# Patient Record
Sex: FEMALE | Race: BLACK OR AFRICAN AMERICAN | NOT HISPANIC OR LATINO | Employment: FULL TIME | RURAL
[De-identification: names, ages, dates, MRNs, and addresses within clinical notes are randomized per-mention and may not be internally consistent; named-entity substitution may affect disease eponyms.]

---

## 2021-05-20 ENCOUNTER — OFFICE VISIT (OUTPATIENT)
Dept: FAMILY MEDICINE | Facility: CLINIC | Age: 41
End: 2021-05-20
Payer: COMMERCIAL

## 2021-05-20 VITALS
BODY MASS INDEX: 26.22 KG/M2 | DIASTOLIC BLOOD PRESSURE: 110 MMHG | HEART RATE: 105 BPM | OXYGEN SATURATION: 100 % | WEIGHT: 148 LBS | RESPIRATION RATE: 18 BRPM | HEIGHT: 63 IN | TEMPERATURE: 98 F | SYSTOLIC BLOOD PRESSURE: 170 MMHG

## 2021-05-20 DIAGNOSIS — M25.532 LEFT WRIST PAIN: Primary | ICD-10-CM

## 2021-05-20 PROCEDURE — 99213 PR OFFICE/OUTPT VISIT, EST, LEVL III, 20-29 MIN: ICD-10-PCS | Mod: 25,,, | Performed by: NURSE PRACTITIONER

## 2021-05-20 PROCEDURE — 99213 OFFICE O/P EST LOW 20 MIN: CPT | Mod: 25,,, | Performed by: NURSE PRACTITIONER

## 2021-05-20 PROCEDURE — 96372 PR INJECTION,THERAP/PROPH/DIAG2ST, IM OR SUBCUT: ICD-10-PCS | Mod: ,,, | Performed by: NURSE PRACTITIONER

## 2021-05-20 PROCEDURE — 96372 THER/PROPH/DIAG INJ SC/IM: CPT | Mod: ,,, | Performed by: NURSE PRACTITIONER

## 2021-05-20 RX ORDER — PREDNISONE 5 MG/1
TABLET ORAL
Qty: 9 TABLET | Refills: 0 | Status: SHIPPED | OUTPATIENT
Start: 2021-05-20

## 2021-05-20 RX ORDER — AMLODIPINE BESYLATE 5 MG/1
5 TABLET ORAL DAILY
COMMUNITY
Start: 2021-03-12

## 2021-05-20 RX ORDER — KETOROLAC TROMETHAMINE 30 MG/ML
60 INJECTION, SOLUTION INTRAMUSCULAR; INTRAVENOUS
Status: COMPLETED | OUTPATIENT
Start: 2021-05-20 | End: 2021-05-20

## 2021-05-20 RX ORDER — DICLOFENAC SODIUM 75 MG/1
75 TABLET, DELAYED RELEASE ORAL 2 TIMES DAILY PRN
Qty: 60 TABLET | Refills: 1 | Status: SHIPPED | OUTPATIENT
Start: 2021-05-20

## 2021-05-20 RX ADMIN — KETOROLAC TROMETHAMINE 60 MG: 30 INJECTION, SOLUTION INTRAMUSCULAR; INTRAVENOUS at 11:05

## 2021-06-02 ENCOUNTER — RECORDS - HEALTHEAST (OUTPATIENT)
Dept: ADMINISTRATIVE | Facility: CLINIC | Age: 41
End: 2021-06-02

## 2021-06-23 ENCOUNTER — OFFICE VISIT (OUTPATIENT)
Dept: FAMILY MEDICINE | Facility: CLINIC | Age: 41
End: 2021-06-23
Payer: COMMERCIAL

## 2021-06-23 VITALS
BODY MASS INDEX: 26.19 KG/M2 | TEMPERATURE: 98 F | OXYGEN SATURATION: 98 % | HEIGHT: 63 IN | RESPIRATION RATE: 17 BRPM | DIASTOLIC BLOOD PRESSURE: 90 MMHG | HEART RATE: 104 BPM | WEIGHT: 147.81 LBS | SYSTOLIC BLOOD PRESSURE: 142 MMHG

## 2021-06-23 DIAGNOSIS — R30.0 DYSURIA: ICD-10-CM

## 2021-06-23 DIAGNOSIS — R31.9 HEMATURIA, UNSPECIFIED TYPE: ICD-10-CM

## 2021-06-23 DIAGNOSIS — J32.9 SINUSITIS, UNSPECIFIED CHRONICITY, UNSPECIFIED LOCATION: Primary | ICD-10-CM

## 2021-06-23 DIAGNOSIS — N30.00 ACUTE CYSTITIS WITHOUT HEMATURIA: ICD-10-CM

## 2021-06-23 LAB
BILIRUB SERPL-MCNC: NEGATIVE MG/DL
BLOOD URINE, POC: ABNORMAL
COLOR, POC UA: YELLOW
GLUCOSE UR QL STRIP: NEGATIVE
KETONES UR QL STRIP: ABNORMAL
LEUKOCYTE ESTERASE URINE, POC: ABNORMAL
NITRITE, POC UA: POSITIVE
PH, POC UA: 6
PROTEIN, POC: ABNORMAL
SPECIFIC GRAVITY, POC UA: 1.02
UROBILINOGEN, POC UA: 1

## 2021-06-23 PROCEDURE — 99214 PR OFFICE/OUTPT VISIT, EST, LEVL IV, 30-39 MIN: ICD-10-PCS | Mod: 25,,, | Performed by: FAMILY MEDICINE

## 2021-06-23 PROCEDURE — 96372 THER/PROPH/DIAG INJ SC/IM: CPT | Mod: ,,, | Performed by: FAMILY MEDICINE

## 2021-06-23 PROCEDURE — 99214 OFFICE O/P EST MOD 30 MIN: CPT | Mod: 25,,, | Performed by: FAMILY MEDICINE

## 2021-06-23 PROCEDURE — 81003 POCT URINALYSIS W/O SCOPE: ICD-10-PCS | Mod: QW,,, | Performed by: FAMILY MEDICINE

## 2021-06-23 PROCEDURE — 99051 MED SERV EVE/WKEND/HOLIDAY: CPT | Mod: ,,, | Performed by: FAMILY MEDICINE

## 2021-06-23 PROCEDURE — 96372 PR INJECTION,THERAP/PROPH/DIAG2ST, IM OR SUBCUT: ICD-10-PCS | Mod: ,,, | Performed by: FAMILY MEDICINE

## 2021-06-23 PROCEDURE — 81003 URINALYSIS AUTO W/O SCOPE: CPT | Mod: QW,,, | Performed by: FAMILY MEDICINE

## 2021-06-23 PROCEDURE — 99051 PR MEDICAL SERVICES, EVE/WKEND/HOLIDAY: ICD-10-PCS | Mod: ,,, | Performed by: FAMILY MEDICINE

## 2021-06-23 RX ORDER — DEXAMETHASONE SODIUM PHOSPHATE 4 MG/ML
4 INJECTION, SOLUTION INTRA-ARTICULAR; INTRALESIONAL; INTRAMUSCULAR; INTRAVENOUS; SOFT TISSUE
Status: COMPLETED | OUTPATIENT
Start: 2021-06-23 | End: 2021-06-23

## 2021-06-23 RX ORDER — SULFAMETHOXAZOLE AND TRIMETHOPRIM 800; 160 MG/1; MG/1
1 TABLET ORAL 2 TIMES DAILY
Qty: 10 TABLET | Refills: 0 | Status: SHIPPED | OUTPATIENT
Start: 2021-06-23 | End: 2021-06-28

## 2021-06-23 RX ORDER — PREDNISONE 20 MG/1
20 TABLET ORAL DAILY
Qty: 5 TABLET | Refills: 0 | Status: SHIPPED | OUTPATIENT
Start: 2021-06-23 | End: 2021-06-28

## 2021-06-23 RX ORDER — CEFTRIAXONE 1 G/1
1 INJECTION, POWDER, FOR SOLUTION INTRAMUSCULAR; INTRAVENOUS
Status: COMPLETED | OUTPATIENT
Start: 2021-06-23 | End: 2021-06-23

## 2021-06-23 RX ADMIN — DEXAMETHASONE SODIUM PHOSPHATE 4 MG: 4 INJECTION, SOLUTION INTRA-ARTICULAR; INTRALESIONAL; INTRAMUSCULAR; INTRAVENOUS; SOFT TISSUE at 07:06

## 2021-06-23 RX ADMIN — CEFTRIAXONE 1 G: 1 INJECTION, POWDER, FOR SOLUTION INTRAMUSCULAR; INTRAVENOUS at 07:06

## 2022-01-12 ENCOUNTER — CLINICAL SUPPORT (OUTPATIENT)
Dept: PRIMARY CARE CLINIC | Facility: CLINIC | Age: 42
End: 2022-01-12

## 2022-01-12 DIAGNOSIS — Z11.1 SCREENING-PULMONARY TB: Primary | ICD-10-CM

## 2022-01-12 PROCEDURE — 99000 SPECIMEN HANDLING OFFICE-LAB: CPT | Mod: ,,, | Performed by: NURSE PRACTITIONER

## 2022-01-12 PROCEDURE — 86580 TB INTRADERMAL TEST: CPT | Mod: ,,, | Performed by: NURSE PRACTITIONER

## 2022-01-12 PROCEDURE — 86580 PR  TB INTRADERMAL TEST: ICD-10-PCS | Mod: ,,, | Performed by: NURSE PRACTITIONER

## 2022-01-12 PROCEDURE — 99000 PR SPECIMEN HANDLING,DR OFF->LAB: ICD-10-PCS | Mod: ,,, | Performed by: NURSE PRACTITIONER

## 2022-01-12 NOTE — PROGRESS NOTES
Subjective:       Patient ID: Penny Lozano is a 41 y.o. female.    Chief Complaint: No chief complaint on file.    HPI  Review of Systems      Objective:      Physical Exam    Assessment:       Problem List Items Addressed This Visit    None     Visit Diagnoses     Screening-pulmonary TB    -  Primary    Relevant Orders    POCT TB Skin Test          Plan:       See scanned documents in .

## 2022-01-14 LAB
TB INDURATION - 48 HR READ: 0 MM
TB INDURATION - 72 HR READ: NORMAL
TB SKIN TEST - 48 HR READ: NEGATIVE
TB SKIN TEST - 72 HR READ: NORMAL

## 2022-09-19 ENCOUNTER — HOSPITAL ENCOUNTER (EMERGENCY)
Facility: HOSPITAL | Age: 42
Discharge: HOME | End: 2022-09-20
Attending: FAMILY MEDICINE

## 2022-09-19 DIAGNOSIS — R07.9 CHEST PAIN: ICD-10-CM

## 2022-09-19 PROCEDURE — 99283 PR EMERGENCY DEPT VISIT,LEVEL III: ICD-10-PCS | Mod: ,,, | Performed by: FAMILY MEDICINE

## 2022-09-19 PROCEDURE — 99283 EMERGENCY DEPT VISIT LOW MDM: CPT | Mod: ,,, | Performed by: FAMILY MEDICINE

## 2022-09-19 PROCEDURE — 93005 ELECTROCARDIOGRAM TRACING: CPT

## 2022-09-19 PROCEDURE — 93010 EKG 12-LEAD: ICD-10-PCS | Mod: ,,, | Performed by: HOSPITALIST

## 2022-09-19 PROCEDURE — 99284 EMERGENCY DEPT VISIT MOD MDM: CPT

## 2022-09-19 PROCEDURE — 93010 ELECTROCARDIOGRAM REPORT: CPT | Mod: ,,, | Performed by: HOSPITALIST

## 2022-09-19 NOTE — Clinical Note
"Penny ARCHIBALD"Blake was seen and treated in our emergency department on 9/19/2022.  She may return to work on 09/21/2022.       If you have any questions or concerns, please don't hesitate to call.      Sydni Brantley MD"

## 2022-09-20 ENCOUNTER — TELEPHONE (OUTPATIENT)
Dept: EMERGENCY MEDICINE | Facility: HOSPITAL | Age: 42
End: 2022-09-20

## 2022-09-20 VITALS
DIASTOLIC BLOOD PRESSURE: 88 MMHG | RESPIRATION RATE: 18 BRPM | BODY MASS INDEX: 31.01 KG/M2 | TEMPERATURE: 99 F | WEIGHT: 175 LBS | HEART RATE: 87 BPM | SYSTOLIC BLOOD PRESSURE: 130 MMHG | HEIGHT: 63 IN | OXYGEN SATURATION: 99 %

## 2022-09-20 LAB
ALBUMIN SERPL BCP-MCNC: 4.1 G/DL (ref 3.5–5)
ALBUMIN/GLOB SERPL: 0.9 {RATIO}
ALP SERPL-CCNC: 70 U/L (ref 37–98)
ALT SERPL W P-5'-P-CCNC: 19 U/L (ref 13–56)
ANION GAP SERPL CALCULATED.3IONS-SCNC: 9 MMOL/L (ref 7–16)
AST SERPL W P-5'-P-CCNC: 15 U/L (ref 15–37)
BASOPHILS # BLD AUTO: 0.03 K/UL (ref 0–0.2)
BASOPHILS NFR BLD AUTO: 0.7 % (ref 0–1)
BILIRUB SERPL-MCNC: 0.7 MG/DL (ref ?–1.2)
BUN SERPL-MCNC: 8 MG/DL (ref 7–18)
BUN/CREAT SERPL: 9 (ref 6–20)
CALCIUM SERPL-MCNC: 9.5 MG/DL (ref 8.5–10.1)
CHLORIDE SERPL-SCNC: 108 MMOL/L (ref 98–107)
CO2 SERPL-SCNC: 27 MMOL/L (ref 21–32)
CREAT SERPL-MCNC: 0.86 MG/DL (ref 0.55–1.02)
DIFFERENTIAL METHOD BLD: ABNORMAL
EGFR (NO RACE VARIABLE) (RUSH/TITUS): 88 ML/MIN/1.73M²
EOSINOPHIL # BLD AUTO: 0.19 K/UL (ref 0–0.5)
EOSINOPHIL NFR BLD AUTO: 4.3 % (ref 1–4)
ERYTHROCYTE [DISTWIDTH] IN BLOOD BY AUTOMATED COUNT: 14.4 % (ref 11.5–14.5)
GLOBULIN SER-MCNC: 4.4 G/DL (ref 2–4)
GLUCOSE SERPL-MCNC: 93 MG/DL (ref 74–106)
HCT VFR BLD AUTO: 39.2 % (ref 38–47)
HGB BLD-MCNC: 14 G/DL (ref 12–16)
IMM GRANULOCYTES # BLD AUTO: 0.02 K/UL (ref 0–0.04)
IMM GRANULOCYTES NFR BLD: 0.5 % (ref 0–0.4)
LYMPHOCYTES # BLD AUTO: 1.55 K/UL (ref 1–4.8)
LYMPHOCYTES NFR BLD AUTO: 35 % (ref 27–41)
MCH RBC QN AUTO: 30.6 PG (ref 27–31)
MCHC RBC AUTO-ENTMCNC: 35.7 G/DL (ref 32–36)
MCV RBC AUTO: 85.6 FL (ref 80–96)
MONOCYTES # BLD AUTO: 0.33 K/UL (ref 0–0.8)
MONOCYTES NFR BLD AUTO: 7.4 % (ref 2–6)
MPC BLD CALC-MCNC: 11.7 FL (ref 9.4–12.4)
NEUTROPHILS # BLD AUTO: 2.31 K/UL (ref 1.8–7.7)
NEUTROPHILS NFR BLD AUTO: 52.1 % (ref 53–65)
NRBC # BLD AUTO: 0 X10E3/UL
NRBC, AUTO (.00): 0 %
PLATELET # BLD AUTO: 238 K/UL (ref 150–400)
POTASSIUM SERPL-SCNC: 3.4 MMOL/L (ref 3.5–5.1)
PROT SERPL-MCNC: 8.5 G/DL (ref 6.4–8.2)
RBC # BLD AUTO: 4.58 M/UL (ref 4.2–5.4)
SODIUM SERPL-SCNC: 141 MMOL/L (ref 136–145)
TROPONIN I SERPL HS-MCNC: 5.6 PG/ML
WBC # BLD AUTO: 4.43 K/UL (ref 4.5–11)

## 2022-09-20 PROCEDURE — 80053 COMPREHEN METABOLIC PANEL: CPT | Performed by: FAMILY MEDICINE

## 2022-09-20 PROCEDURE — 36415 COLL VENOUS BLD VENIPUNCTURE: CPT | Performed by: FAMILY MEDICINE

## 2022-09-20 PROCEDURE — 85025 COMPLETE CBC W/AUTO DIFF WBC: CPT | Performed by: FAMILY MEDICINE

## 2022-09-20 PROCEDURE — 84484 ASSAY OF TROPONIN QUANT: CPT | Performed by: FAMILY MEDICINE

## 2022-09-20 NOTE — ED PROVIDER NOTES
Encounter Date: 9/19/2022    SCRIBE #1 NOTE: I, Jessica Heaton, am scribing for, and in the presence of,  Sydni Nesbitt MD. I have scribed the entire note.     History     Chief Complaint   Patient presents with    Hypertension     The patient is a 42 y.o. female that presents to the emergency department due to chest pain. Patient explains that she was laying in bed tonight when she began having chest pains and she checked it at home and she believed it was high so she came to the ER. Patient does not have an hx of lung or heart problems and the only medications they are on is blood pressure medication. Patient rates the pain a 5 out of 10. No other medical hx or symptoms were reported.     The history is provided by the patient. No  was used.   Review of patient's allergies indicates:  No Known Allergies  History reviewed. No pertinent past medical history.  History reviewed. No pertinent surgical history.  History reviewed. No pertinent family history.  Social History     Tobacco Use    Smoking status: Never    Smokeless tobacco: Never   Substance Use Topics    Alcohol use: Never    Drug use: Never     Review of Systems   Constitutional: Negative.    HENT: Negative.     Eyes: Negative.    Respiratory:  Positive for chest tightness. Negative for shortness of breath.    Cardiovascular:  Positive for chest pain.   Gastrointestinal: Negative.    Endocrine: Negative.    Genitourinary: Negative.    Musculoskeletal: Negative.    Skin: Negative.    Allergic/Immunologic: Negative.    Neurological:  Positive for headaches.   Hematological: Negative.    Psychiatric/Behavioral: Negative.     All other systems reviewed and are negative.    Physical Exam     Initial Vitals [09/19/22 2353]   BP Pulse Resp Temp SpO2   (!) 137/98 (!) 111 20 98.7 °F (37.1 °C) 99 %      MAP       --         Physical Exam    Constitutional: She appears well-developed and well-nourished.   HENT:   Head: Normocephalic and  atraumatic.   Right Ear: External ear normal.   Left Ear: External ear normal.   Nose: Nose normal.   Mouth/Throat: Oropharynx is clear and moist.   Eyes: Conjunctivae and EOM are normal. Pupils are equal, round, and reactive to light.   Neck:   Normal range of motion.  Cardiovascular:  Normal rate, regular rhythm and normal heart sounds.           Pulmonary/Chest: Breath sounds normal.   Abdominal: Abdomen is soft. Bowel sounds are normal. She exhibits no distension. There is no abdominal tenderness.   Musculoskeletal:         General: Normal range of motion.      Cervical back: Normal range of motion.     Neurological: She is alert and oriented to person, place, and time. She has normal strength and normal reflexes.   Skin: Skin is warm and dry.   Psychiatric: She has a normal mood and affect. Her behavior is normal. Judgment and thought content normal.       ED Course   Procedures  Labs Reviewed   COMPREHENSIVE METABOLIC PANEL - Abnormal; Notable for the following components:       Result Value    Potassium 3.4 (*)     Chloride 108 (*)     Total Protein 8.5 (*)     Globulin 4.4 (*)     All other components within normal limits   CBC WITH DIFFERENTIAL - Abnormal; Notable for the following components:    WBC 4.43 (*)     Neutrophils % 52.1 (*)     Monocytes % 7.4 (*)     Eosinophils % 4.3 (*)     Immature Granulocytes % 0.5 (*)     All other components within normal limits   TROPONIN I - Normal   CBC W/ AUTO DIFFERENTIAL    Narrative:     The following orders were created for panel order CBC auto differential.  Procedure                               Abnormality         Status                     ---------                               -----------         ------                     CBC with Differential[[id number]]        Abnormal            Final result                 Please view results for these tests on the individual orders.   EXTRA TUBES    Narrative:     The following orders were created for panel order  EXTRA TUBES.  Procedure                               Abnormality         Status                     ---------                               -----------         ------                     Light Blue Top Hold[[id number]]                              In process                 Lavender Top Hold[[id number]]                                In process                   Please view results for these tests on the individual orders.   LIGHT BLUE TOP HOLD   LAVENDER TOP HOLD          Imaging Results    None          Medications - No data to display  Medical Decision Making:   Pt was accidentally released by RN prior to second troponin being released. Repeat troponin was drawn, but rejected. She will be contacted to return.           Attending Attestation:           Physician Attestation for Scribe:  Physician Attestation Statement for Scribe #1: I, Sydni Nesbitt MD, reviewed documentation, as scribed by Jessica Heaton in my presence, and it is both accurate and complete.                        Clinical Impression:   Final diagnoses:  [R07.9] Chest pain      ED Disposition Condition    Discharge           ED Prescriptions    None       Follow-up Information    None          Sydni Brantley MD  09/20/22 0309       Sydni Brantley MD  09/20/22 0353

## 2022-11-01 ENCOUNTER — HOSPITAL ENCOUNTER (EMERGENCY)
Facility: HOSPITAL | Age: 42
Discharge: HOME | End: 2022-11-02

## 2022-11-01 DIAGNOSIS — B34.9 VIRAL ILLNESS: Primary | ICD-10-CM

## 2022-11-01 LAB
FLUAV AG UPPER RESP QL IA.RAPID: NEGATIVE
FLUBV AG UPPER RESP QL IA.RAPID: NEGATIVE
SARS-COV+SARS-COV-2 AG RESP QL IA.RAPID: NEGATIVE

## 2022-11-01 PROCEDURE — 99283 PR EMERGENCY DEPT VISIT,LEVEL III: ICD-10-PCS | Mod: ,,, | Performed by: NURSE PRACTITIONER

## 2022-11-01 PROCEDURE — 25000003 PHARM REV CODE 250: Performed by: NURSE PRACTITIONER

## 2022-11-01 PROCEDURE — 87428 SARSCOV & INF VIR A&B AG IA: CPT | Performed by: NURSE PRACTITIONER

## 2022-11-01 PROCEDURE — 99283 EMERGENCY DEPT VISIT LOW MDM: CPT | Mod: ,,, | Performed by: NURSE PRACTITIONER

## 2022-11-01 PROCEDURE — 99283 EMERGENCY DEPT VISIT LOW MDM: CPT

## 2022-11-01 RX ORDER — ONDANSETRON 4 MG/1
4 TABLET, ORALLY DISINTEGRATING ORAL EVERY 6 HOURS PRN
Qty: 15 TABLET | Refills: 0 | Status: SHIPPED | OUTPATIENT
Start: 2022-11-01

## 2022-11-01 RX ORDER — ONDANSETRON 4 MG/1
4 TABLET, ORALLY DISINTEGRATING ORAL
Status: COMPLETED | OUTPATIENT
Start: 2022-11-01 | End: 2022-11-01

## 2022-11-01 RX ADMIN — ONDANSETRON 4 MG: 4 TABLET, ORALLY DISINTEGRATING ORAL at 11:11

## 2022-11-01 NOTE — Clinical Note
"Penny "PENNY" Blake was seen and treated in our emergency department on 11/1/2022.  She may return to work on 11/02/2022.  Negative covid and negative flu a & b on 11/01/22.     If you have any questions or concerns, please don't hesitate to call.      JOO CookP"

## 2022-11-02 VITALS
DIASTOLIC BLOOD PRESSURE: 95 MMHG | HEIGHT: 63 IN | TEMPERATURE: 99 F | SYSTOLIC BLOOD PRESSURE: 149 MMHG | WEIGHT: 180 LBS | HEART RATE: 91 BPM | OXYGEN SATURATION: 97 % | BODY MASS INDEX: 31.89 KG/M2 | RESPIRATION RATE: 18 BRPM

## 2022-11-02 NOTE — ED PROVIDER NOTES
Encounter Date: 11/1/2022       History     Chief Complaint   Patient presents with    Nausea     Patient presents to ER with complaint of nausea and vomiting.  Patient states her symptoms started today while at work.  She States she has vomited x 2 today.  She was told by her employer to get checked for covid and flu before returning to work.  She denies fever or body aches.  No cough or congestion.  No sore throat or headache.  She states the vomiting has resolved but she is still feeling nauseated at times.      The history is provided by the patient. No  was used.   Review of patient's allergies indicates:  No Known Allergies  History reviewed. No pertinent past medical history.  History reviewed. No pertinent surgical history.  History reviewed. No pertinent family history.  Social History     Tobacco Use    Smoking status: Never    Smokeless tobacco: Never   Substance Use Topics    Alcohol use: Never    Drug use: Never     Review of Systems   Gastrointestinal:  Positive for nausea and vomiting.   All other systems reviewed and are negative.    Physical Exam     Initial Vitals [11/01/22 1940]   BP Pulse Resp Temp SpO2   (!) 153/104 104 16 99 °F (37.2 °C) 100 %      MAP       --         Physical Exam    Nursing note and vitals reviewed.  Constitutional: She appears well-developed and well-nourished.   HENT:   Head: Normocephalic.   Right Ear: External ear normal.   Left Ear: External ear normal.   Nose: Nose normal.   Mouth/Throat: Oropharynx is clear and moist.   Eyes: Conjunctivae and EOM are normal. Pupils are equal, round, and reactive to light.   Neck: Neck supple.   Normal range of motion.  Cardiovascular:  Normal rate, regular rhythm, normal heart sounds and intact distal pulses.           Pulmonary/Chest: Breath sounds normal.   Abdominal: Abdomen is soft. Bowel sounds are normal.   Musculoskeletal:         General: Normal range of motion.      Cervical back: Normal range of motion  and neck supple.     Neurological: She is alert and oriented to person, place, and time. She has normal strength. GCS score is 15. GCS eye subscore is 4. GCS verbal subscore is 5. GCS motor subscore is 6.   Skin: Skin is warm and dry. Capillary refill takes less than 2 seconds.   Psychiatric: She has a normal mood and affect. Her behavior is normal. Judgment and thought content normal.       Medical Screening Exam   See Full Note    ED Course   Procedures  Labs Reviewed   SARS-COV2 (COVID) W/ FLU ANTIGEN - Normal    Narrative:     Negative SARS-CoV results should not be used as the sole basis for treatment or patient management decisions; negative results should be considered in the context of a patient's recent exposures, history and the presene of clinical signs and symptoms consistent with COVID-19.  Negative results should be treated as presumptive and confirmed by molecular assay, if necessary for patient management.          Imaging Results    None          Medications   ondansetron disintegrating tablet 4 mg (4 mg Oral Given 11/1/22 2322)                       Clinical Impression:   Final diagnoses:  [B34.9] Viral illness (Primary)      ED Disposition Condition    Discharge Stable          ED Prescriptions       Medication Sig Dispense Start Date End Date Auth. Provider    ondansetron (ZOFRAN-ODT) 4 MG TbDL Take 1 tablet (4 mg total) by mouth every 6 (six) hours as needed (nausea). 15 tablet 11/1/2022 -- MIROSLAVA Cook          Follow-up Information       Follow up With Specialties Details Why Contact Info    Primary Care Provider  Schedule an appointment as soon as possible for a visit in 2 days If symptoms worsen              MIROSLAVA Cook  11/01/22 3930

## 2022-11-02 NOTE — DISCHARGE INSTRUCTIONS
Take medication as prescribed and as needed for nausea.   Follow up with PCP in 2 days for recheck if symptoms do not improve.   Return to ER with new or worsening symptoms.

## 2024-08-02 ENCOUNTER — HOSPITAL ENCOUNTER (OUTPATIENT)
Facility: HOSPITAL | Age: 44
Discharge: HOME | End: 2024-08-03
Attending: EMERGENCY MEDICINE | Admitting: FAMILY MEDICINE
Payer: COMMERCIAL

## 2024-08-02 DIAGNOSIS — I21.4 NON-ST ELEVATION MYOCARDIAL INFARCTION (NSTEMI): ICD-10-CM

## 2024-08-02 DIAGNOSIS — I21.9 MYOCARDIAL INFARCTION, UNSPECIFIED MI TYPE, UNSPECIFIED ARTERY: Primary | ICD-10-CM

## 2024-08-02 DIAGNOSIS — E87.6 HYPOKALEMIA: ICD-10-CM

## 2024-08-02 DIAGNOSIS — I10 PRIMARY HYPERTENSION: ICD-10-CM

## 2024-08-02 DIAGNOSIS — R07.9 CHEST PAIN: ICD-10-CM

## 2024-08-02 DIAGNOSIS — I21.4 NSTEMI (NON-ST ELEVATION MYOCARDIAL INFARCTION): ICD-10-CM

## 2024-08-02 DIAGNOSIS — R29.818 SUSPECTED SLEEP APNEA: ICD-10-CM

## 2024-08-02 DIAGNOSIS — R79.89 ELEVATED TROPONIN: ICD-10-CM

## 2024-08-02 DIAGNOSIS — Z21 ASYMPTOMATIC HIV INFECTION, WITH NO HISTORY OF HIV-RELATED ILLNESS: ICD-10-CM

## 2024-08-02 DIAGNOSIS — I21.4 NSTEMI (NON-ST ELEVATED MYOCARDIAL INFARCTION): ICD-10-CM

## 2024-08-02 PROBLEM — N87.1 DYSPLASIA OF CERVIX, HIGH GRADE CIN 2: Status: ACTIVE | Noted: 2022-09-16

## 2024-08-02 LAB
ABORH RETYPE: NORMAL
ALBUMIN SERPL BCP-MCNC: 3.7 G/DL (ref 3.5–5)
ALBUMIN/GLOB SERPL: 0.8 {RATIO}
ALP SERPL-CCNC: 71 U/L (ref 37–98)
ALT SERPL W P-5'-P-CCNC: 18 U/L (ref 13–56)
ANION GAP SERPL CALCULATED.3IONS-SCNC: 8 MMOL/L (ref 7–16)
AORTIC ROOT ANNULUS: 2.79 CM
AORTIC VALVE CUSP SEPERATION: 1.82 CM
APICAL FOUR CHAMBER EJECTION FRACTION: 71 %
APTT PPP: 26.9 SECONDS (ref 25.2–37.3)
AST SERPL W P-5'-P-CCNC: 11 U/L (ref 15–37)
AV INDEX (PROSTH): 0.7
AV MEAN GRADIENT: 5 MMHG
AV PEAK GRADIENT: 10 MMHG
AV VALVE AREA BY VELOCITY RATIO: 1.87 CM²
AV VALVE AREA: 2.21 CM²
AV VELOCITY RATIO: 0.59
BASOPHILS # BLD AUTO: 0.03 K/UL (ref 0–0.2)
BASOPHILS NFR BLD AUTO: 0.5 % (ref 0–1)
BILIRUB SERPL-MCNC: 0.6 MG/DL (ref ?–1.2)
BSA FOR ECHO PROCEDURE: 2 M2
BUN SERPL-MCNC: 11 MG/DL (ref 7–18)
BUN/CREAT SERPL: 9 (ref 6–20)
CALCIUM SERPL-MCNC: 9.2 MG/DL (ref 8.5–10.1)
CHLORIDE SERPL-SCNC: 111 MMOL/L (ref 98–107)
CO2 SERPL-SCNC: 27 MMOL/L (ref 21–32)
CREAT SERPL-MCNC: 1.18 MG/DL (ref 0.55–1.02)
CV ECHO LV RWT: 0.77 CM
DIFFERENTIAL METHOD BLD: ABNORMAL
DOP CALC AO PEAK VEL: 1.56 M/S
DOP CALC AO VTI: 23.3 CM
DOP CALC LVOT AREA: 3.2 CM2
DOP CALC LVOT DIAMETER: 2.01 CM
DOP CALC LVOT PEAK VEL: 0.92 M/S
DOP CALC LVOT STROKE VOLUME: 51.38 CM3
DOP CALCLVOT PEAK VEL VTI: 16.2 CM
E WAVE DECELERATION TIME: 166.74 MSEC
E/A RATIO: 0.68
E/E' RATIO: 8 M/S
ECHO LV POSTERIOR WALL: 1.34 CM (ref 0.6–1.1)
EGFR (NO RACE VARIABLE) (RUSH/TITUS): 59 ML/MIN/1.73M2
EJECTION FRACTION: 50 %
EOSINOPHIL # BLD AUTO: 0.12 K/UL (ref 0–0.5)
EOSINOPHIL NFR BLD AUTO: 2.1 % (ref 1–4)
ERYTHROCYTE [DISTWIDTH] IN BLOOD BY AUTOMATED COUNT: 14.2 % (ref 11.5–14.5)
FRACTIONAL SHORTENING: 25 % (ref 28–44)
GLOBULIN SER-MCNC: 4.9 G/DL (ref 2–4)
GLUCOSE SERPL-MCNC: 103 MG/DL (ref 70–105)
GLUCOSE SERPL-MCNC: 108 MG/DL (ref 74–106)
GLUCOSE SERPL-MCNC: 150 MG/DL (ref 70–105)
HCT VFR BLD AUTO: 38.2 % (ref 38–47)
HGB BLD-MCNC: 12.9 G/DL (ref 12–16)
IMM GRANULOCYTES # BLD AUTO: 0.01 K/UL (ref 0–0.04)
IMM GRANULOCYTES NFR BLD: 0.2 % (ref 0–0.4)
INDIRECT COOMBS: NORMAL
INR BLD: 1.04
INTERVENTRICULAR SEPTUM: 1.23 CM (ref 0.6–1.1)
IVC DIAMETER: 0.7 CM
LEFT ATRIUM AREA SYSTOLIC (APICAL 4 CHAMBER): 11.62 CM2
LEFT ATRIUM SIZE: 2.39 CM
LEFT INTERNAL DIMENSION IN SYSTOLE: 2.64 CM (ref 2.1–4)
LEFT VENTRICLE DIASTOLIC VOLUME INDEX: 26.23 ML/M2
LEFT VENTRICLE DIASTOLIC VOLUME: 50.63 ML
LEFT VENTRICLE END DIASTOLIC VOLUME APICAL 4 CHAMBER: 43.34 ML
LEFT VENTRICLE END SYSTOLIC VOLUME APICAL 4 CHAMBER: 22.7 ML
LEFT VENTRICLE MASS INDEX: 78 G/M2
LEFT VENTRICLE SYSTOLIC VOLUME INDEX: 13.3 ML/M2
LEFT VENTRICLE SYSTOLIC VOLUME: 25.62 ML
LEFT VENTRICULAR INTERNAL DIMENSION IN DIASTOLE: 3.5 CM (ref 3.5–6)
LEFT VENTRICULAR MASS: 151 G
LV LATERAL E/E' RATIO: 6.91 M/S
LV SEPTAL E/E' RATIO: 9.5 M/S
LVED V (TEICH): 50.63 ML
LVES V (TEICH): 25.62 ML
LVOT MG: 2.15 MMHG
LVOT MV: 0.7 CM/S
LYMPHOCYTES # BLD AUTO: 1.91 K/UL (ref 1–4.8)
LYMPHOCYTES NFR BLD AUTO: 32.9 % (ref 27–41)
MAGNESIUM SERPL-MCNC: 2.2 MG/DL (ref 1.7–2.3)
MCH RBC QN AUTO: 30.3 PG (ref 27–31)
MCHC RBC AUTO-ENTMCNC: 33.8 G/DL (ref 32–36)
MCV RBC AUTO: 89.7 FL (ref 80–96)
MONOCYTES # BLD AUTO: 0.4 K/UL (ref 0–0.8)
MONOCYTES NFR BLD AUTO: 6.9 % (ref 2–6)
MPC BLD CALC-MCNC: 12.4 FL (ref 9.4–12.4)
MV PEAK A VEL: 1.11 M/S
MV PEAK E VEL: 0.76 M/S
MV STENOSIS PRESSURE HALF TIME: 48.35 MS
MV VALVE AREA P 1/2 METHOD: 4.55 CM2
NEUTROPHILS # BLD AUTO: 3.33 K/UL (ref 1.8–7.7)
NEUTROPHILS NFR BLD AUTO: 57.4 % (ref 53–65)
NRBC # BLD AUTO: 0 X10E3/UL
NRBC, AUTO (.00): 0 %
NT-PROBNP SERPL-MCNC: 141 PG/ML (ref 1–125)
PISA TR MAX VEL: 2.45 M/S
PLATELET # BLD AUTO: 225 K/UL (ref 150–400)
POTASSIUM SERPL-SCNC: 3.1 MMOL/L (ref 3.5–5.1)
PROT SERPL-MCNC: 8.6 G/DL (ref 6.4–8.2)
PROTHROMBIN TIME: 13.5 SECONDS (ref 11.7–14.7)
PV PEAK GRADIENT: 3 MMHG
PV PEAK VELOCITY: 0.9 M/S
RA MAJOR: 3.54 CM
RA PRESSURE ESTIMATED: 3 MMHG
RBC # BLD AUTO: 4.26 M/UL (ref 4.2–5.4)
RH BLD: NORMAL
RIGHT VENTRICLE DIASTOLIC BASEL DIMENSION: 3 CM
RIGHT VENTRICLE DIASTOLIC LENGTH: 4.3 CM
RIGHT VENTRICLE DIASTOLIC MID DIMENSION: 2.4 CM
RIGHT VENTRICULAR LENGTH IN DIASTOLE (APICAL 4-CHAMBER VIEW): 4.29 CM
RV MID DIAMA: 2.39 CM
RV TB RVSP: 5 MMHG
SODIUM SERPL-SCNC: 143 MMOL/L (ref 136–145)
SPECIMEN OUTDATE: NORMAL
TDI LATERAL: 0.11 M/S
TDI SEPTAL: 0.08 M/S
TDI: 0.1 M/S
TR MAX PG: 24 MMHG
TRICUSPID ANNULAR PLANE SYSTOLIC EXCURSION: 1.97 CM
TROPONIN I SERPL DL<=0.01 NG/ML-MCNC: 136.3 PG/ML
TROPONIN I SERPL DL<=0.01 NG/ML-MCNC: 270.3 PG/ML
TV REST PULMONARY ARTERY PRESSURE: 27 MMHG
WBC # BLD AUTO: 5.8 K/UL (ref 4.5–11)
Z-SCORE OF LEFT VENTRICULAR DIMENSION IN END DIASTOLE: -4.45
Z-SCORE OF LEFT VENTRICULAR DIMENSION IN END SYSTOLE: -1.91

## 2024-08-02 PROCEDURE — 92928 PRQ TCAT PLMT NTRAC ST 1 LES: CPT | Mod: LD,,, | Performed by: HOSPITALIST

## 2024-08-02 PROCEDURE — 80053 COMPREHEN METABOLIC PANEL: CPT | Performed by: EMERGENCY MEDICINE

## 2024-08-02 PROCEDURE — 86900 BLOOD TYPING SEROLOGIC ABO: CPT | Performed by: FAMILY MEDICINE

## 2024-08-02 PROCEDURE — 27201423 OPTIME MED/SURG SUP & DEVICES STERILE SUPPLY: Performed by: HOSPITALIST

## 2024-08-02 PROCEDURE — 84484 ASSAY OF TROPONIN QUANT: CPT | Mod: 91 | Performed by: EMERGENCY MEDICINE

## 2024-08-02 PROCEDURE — 99152 MOD SED SAME PHYS/QHP 5/>YRS: CPT | Mod: ,,, | Performed by: HOSPITALIST

## 2024-08-02 PROCEDURE — 94761 N-INVAS EAR/PLS OXIMETRY MLT: CPT

## 2024-08-02 PROCEDURE — 96366 THER/PROPH/DIAG IV INF ADDON: CPT

## 2024-08-02 PROCEDURE — 96375 TX/PRO/DX INJ NEW DRUG ADDON: CPT

## 2024-08-02 PROCEDURE — 99285 EMERGENCY DEPT VISIT HI MDM: CPT | Mod: 25

## 2024-08-02 PROCEDURE — 93458 L HRT ARTERY/VENTRICLE ANGIO: CPT | Mod: XU | Performed by: HOSPITALIST

## 2024-08-02 PROCEDURE — C1887 CATHETER, GUIDING: HCPCS | Performed by: HOSPITALIST

## 2024-08-02 PROCEDURE — 85025 COMPLETE CBC W/AUTO DIFF WBC: CPT | Performed by: EMERGENCY MEDICINE

## 2024-08-02 PROCEDURE — 36415 COLL VENOUS BLD VENIPUNCTURE: CPT | Mod: 91 | Performed by: FAMILY MEDICINE

## 2024-08-02 PROCEDURE — 85610 PROTHROMBIN TIME: CPT | Performed by: EMERGENCY MEDICINE

## 2024-08-02 PROCEDURE — C1769 GUIDE WIRE: HCPCS | Performed by: HOSPITALIST

## 2024-08-02 PROCEDURE — G0378 HOSPITAL OBSERVATION PER HR: HCPCS

## 2024-08-02 PROCEDURE — 25000003 PHARM REV CODE 250: Performed by: NURSE PRACTITIONER

## 2024-08-02 PROCEDURE — 25000003 PHARM REV CODE 250: Performed by: EMERGENCY MEDICINE

## 2024-08-02 PROCEDURE — 63600175 PHARM REV CODE 636 W HCPCS: Performed by: HOSPITALIST

## 2024-08-02 PROCEDURE — C9600 PERC DRUG-EL COR STENT SING: HCPCS | Mod: LD | Performed by: HOSPITALIST

## 2024-08-02 PROCEDURE — 82962 GLUCOSE BLOOD TEST: CPT

## 2024-08-02 PROCEDURE — 63600175 PHARM REV CODE 636 W HCPCS: Performed by: EMERGENCY MEDICINE

## 2024-08-02 PROCEDURE — C1894 INTRO/SHEATH, NON-LASER: HCPCS | Performed by: HOSPITALIST

## 2024-08-02 PROCEDURE — 83880 ASSAY OF NATRIURETIC PEPTIDE: CPT | Performed by: EMERGENCY MEDICINE

## 2024-08-02 PROCEDURE — 93005 ELECTROCARDIOGRAM TRACING: CPT

## 2024-08-02 PROCEDURE — 36415 COLL VENOUS BLD VENIPUNCTURE: CPT | Performed by: EMERGENCY MEDICINE

## 2024-08-02 PROCEDURE — 96365 THER/PROPH/DIAG IV INF INIT: CPT

## 2024-08-02 PROCEDURE — 83735 ASSAY OF MAGNESIUM: CPT | Performed by: EMERGENCY MEDICINE

## 2024-08-02 PROCEDURE — C1725 CATH, TRANSLUMIN NON-LASER: HCPCS | Performed by: HOSPITALIST

## 2024-08-02 PROCEDURE — 25000003 PHARM REV CODE 250: Performed by: FAMILY MEDICINE

## 2024-08-02 PROCEDURE — 96361 HYDRATE IV INFUSION ADD-ON: CPT

## 2024-08-02 PROCEDURE — 25500020 PHARM REV CODE 255: Performed by: HOSPITALIST

## 2024-08-02 PROCEDURE — 86901 BLOOD TYPING SEROLOGIC RH(D): CPT | Performed by: FAMILY MEDICINE

## 2024-08-02 PROCEDURE — 84484 ASSAY OF TROPONIN QUANT: CPT | Performed by: EMERGENCY MEDICINE

## 2024-08-02 PROCEDURE — 25000003 PHARM REV CODE 250: Performed by: HOSPITALIST

## 2024-08-02 PROCEDURE — 93010 ELECTROCARDIOGRAM REPORT: CPT | Mod: ,,, | Performed by: INTERNAL MEDICINE

## 2024-08-02 PROCEDURE — C1874 STENT, COATED/COV W/DEL SYS: HCPCS | Performed by: HOSPITALIST

## 2024-08-02 PROCEDURE — 99223 1ST HOSP IP/OBS HIGH 75: CPT | Mod: ,,, | Performed by: FAMILY MEDICINE

## 2024-08-02 PROCEDURE — 99152 MOD SED SAME PHYS/QHP 5/>YRS: CPT | Performed by: HOSPITALIST

## 2024-08-02 PROCEDURE — 85730 THROMBOPLASTIN TIME PARTIAL: CPT | Performed by: EMERGENCY MEDICINE

## 2024-08-02 PROCEDURE — 99153 MOD SED SAME PHYS/QHP EA: CPT | Performed by: HOSPITALIST

## 2024-08-02 PROCEDURE — 93458 L HRT ARTERY/VENTRICLE ANGIO: CPT | Mod: 26,XU,51, | Performed by: HOSPITALIST

## 2024-08-02 DEVICE — EVEROLIMUS-ELUTING PLATINUM CHROMIUM CORONARY STENT SYSTEM
Type: IMPLANTABLE DEVICE | Site: CORONARY | Status: FUNCTIONAL
Brand: SYNERGY™ XD

## 2024-08-02 RX ORDER — HYDRALAZINE HYDROCHLORIDE 20 MG/ML
10 INJECTION INTRAMUSCULAR; INTRAVENOUS EVERY 6 HOURS PRN
Status: DISCONTINUED | OUTPATIENT
Start: 2024-08-02 | End: 2024-08-03 | Stop reason: HOSPADM

## 2024-08-02 RX ORDER — NAPROXEN SODIUM 220 MG/1
81 TABLET, FILM COATED ORAL DAILY
Status: DISCONTINUED | OUTPATIENT
Start: 2024-08-03 | End: 2024-08-03 | Stop reason: HOSPADM

## 2024-08-02 RX ORDER — CETIRIZINE HYDROCHLORIDE 10 MG/1
10 TABLET ORAL DAILY
COMMUNITY
Start: 2024-06-05 | End: 2025-06-05

## 2024-08-02 RX ORDER — FENTANYL CITRATE 50 UG/ML
INJECTION, SOLUTION INTRAMUSCULAR; INTRAVENOUS
Status: DISCONTINUED | OUTPATIENT
Start: 2024-08-02 | End: 2024-08-02 | Stop reason: HOSPADM

## 2024-08-02 RX ORDER — IOPAMIDOL 755 MG/ML
INJECTION, SOLUTION INTRAVASCULAR
Status: DISCONTINUED | OUTPATIENT
Start: 2024-08-02 | End: 2024-08-02 | Stop reason: HOSPADM

## 2024-08-02 RX ORDER — NITROGLYCERIN 5 MG/ML
INJECTION, SOLUTION INTRAVENOUS
Status: DISCONTINUED | OUTPATIENT
Start: 2024-08-02 | End: 2024-08-02 | Stop reason: HOSPADM

## 2024-08-02 RX ORDER — GUAIFENESIN AND DEXTROMETHORPHAN HYDROBROMIDE 10; 100 MG/5ML; MG/5ML
10 SYRUP ORAL EVERY 4 HOURS PRN
Status: DISCONTINUED | OUTPATIENT
Start: 2024-08-02 | End: 2024-08-03 | Stop reason: HOSPADM

## 2024-08-02 RX ORDER — NITROGLYCERIN 0.4 MG/1
0.4 TABLET SUBLINGUAL EVERY 5 MIN PRN
Status: DISCONTINUED | OUTPATIENT
Start: 2024-08-02 | End: 2024-08-03 | Stop reason: HOSPADM

## 2024-08-02 RX ORDER — VERAPAMIL HYDROCHLORIDE 2.5 MG/ML
INJECTION, SOLUTION INTRAVENOUS
Status: DISCONTINUED | OUTPATIENT
Start: 2024-08-02 | End: 2024-08-02

## 2024-08-02 RX ORDER — HYDROMORPHONE HYDROCHLORIDE 2 MG/ML
0.5 INJECTION, SOLUTION INTRAMUSCULAR; INTRAVENOUS; SUBCUTANEOUS
Status: DISCONTINUED | OUTPATIENT
Start: 2024-08-02 | End: 2024-08-03 | Stop reason: HOSPADM

## 2024-08-02 RX ORDER — ONDANSETRON HYDROCHLORIDE 2 MG/ML
4 INJECTION, SOLUTION INTRAVENOUS EVERY 6 HOURS PRN
Status: DISCONTINUED | OUTPATIENT
Start: 2024-08-02 | End: 2024-08-03 | Stop reason: HOSPADM

## 2024-08-02 RX ORDER — AMLODIPINE BESYLATE 10 MG/1
10 TABLET ORAL
COMMUNITY
Start: 2024-06-05 | End: 2025-06-05

## 2024-08-02 RX ORDER — LIDOCAINE HYDROCHLORIDE 10 MG/ML
INJECTION, SOLUTION INFILTRATION; PERINEURAL
Status: DISCONTINUED | OUTPATIENT
Start: 2024-08-02 | End: 2024-08-02 | Stop reason: HOSPADM

## 2024-08-02 RX ORDER — ALUMINUM HYDROXIDE, MAGNESIUM HYDROXIDE, AND SIMETHICONE 2400; 240; 2400 MG/30ML; MG/30ML; MG/30ML
30 SUSPENSION ORAL EVERY 6 HOURS PRN
Status: DISCONTINUED | OUTPATIENT
Start: 2024-08-02 | End: 2024-08-03 | Stop reason: HOSPADM

## 2024-08-02 RX ORDER — METOPROLOL TARTRATE 25 MG/1
25 TABLET, FILM COATED ORAL 2 TIMES DAILY
Status: DISCONTINUED | OUTPATIENT
Start: 2024-08-02 | End: 2024-08-03 | Stop reason: HOSPADM

## 2024-08-02 RX ORDER — HEPARIN SODIUM 5000 [USP'U]/ML
INJECTION, SOLUTION INTRAVENOUS; SUBCUTANEOUS
Status: DISCONTINUED | OUTPATIENT
Start: 2024-08-02 | End: 2024-08-02 | Stop reason: HOSPADM

## 2024-08-02 RX ORDER — KETOCONAZOLE 20 MG/ML
1 SHAMPOO, SUSPENSION TOPICAL
COMMUNITY

## 2024-08-02 RX ORDER — AMLODIPINE BESYLATE 10 MG/1
10 TABLET ORAL DAILY
Status: DISCONTINUED | OUTPATIENT
Start: 2024-08-03 | End: 2024-08-03 | Stop reason: HOSPADM

## 2024-08-02 RX ORDER — ASPIRIN 325 MG
325 TABLET ORAL
Status: COMPLETED | OUTPATIENT
Start: 2024-08-02 | End: 2024-08-02

## 2024-08-02 RX ORDER — TALC
9 POWDER (GRAM) TOPICAL NIGHTLY PRN
Status: DISCONTINUED | OUTPATIENT
Start: 2024-08-02 | End: 2024-08-03 | Stop reason: HOSPADM

## 2024-08-02 RX ORDER — HEPARIN SODIUM,PORCINE/D5W 25000/250
0-40 INTRAVENOUS SOLUTION INTRAVENOUS CONTINUOUS
Status: DISCONTINUED | OUTPATIENT
Start: 2024-08-02 | End: 2024-08-02

## 2024-08-02 RX ORDER — ACETAMINOPHEN 325 MG/1
650 TABLET ORAL EVERY 6 HOURS PRN
Status: DISCONTINUED | OUTPATIENT
Start: 2024-08-02 | End: 2024-08-03 | Stop reason: HOSPADM

## 2024-08-02 RX ORDER — OXYCODONE HYDROCHLORIDE 5 MG/1
5 TABLET ORAL EVERY 4 HOURS PRN
Status: DISPENSED | OUTPATIENT
Start: 2024-08-02 | End: 2024-08-03

## 2024-08-02 RX ORDER — SIMETHICONE 80 MG
1 TABLET,CHEWABLE ORAL 3 TIMES DAILY PRN
Status: DISCONTINUED | OUTPATIENT
Start: 2024-08-02 | End: 2024-08-03 | Stop reason: HOSPADM

## 2024-08-02 RX ORDER — SODIUM CHLORIDE 9 MG/ML
INJECTION, SOLUTION INTRAVENOUS
Status: DISCONTINUED | OUTPATIENT
Start: 2024-08-02 | End: 2024-08-03 | Stop reason: HOSPADM

## 2024-08-02 RX ORDER — LISINOPRIL 10 MG/1
10 TABLET ORAL DAILY
Status: ON HOLD | COMMUNITY
Start: 2024-07-05 | End: 2024-08-03 | Stop reason: HOSPADM

## 2024-08-02 RX ORDER — AMLODIPINE BESYLATE 10 MG/1
10 TABLET ORAL DAILY
Status: DISCONTINUED | OUTPATIENT
Start: 2024-08-02 | End: 2024-08-02

## 2024-08-02 RX ORDER — MIDAZOLAM HYDROCHLORIDE 1 MG/ML
INJECTION INTRAMUSCULAR; INTRAVENOUS
Status: DISCONTINUED | OUTPATIENT
Start: 2024-08-02 | End: 2024-08-02 | Stop reason: HOSPADM

## 2024-08-02 RX ORDER — LABETALOL HYDROCHLORIDE 5 MG/ML
10 INJECTION, SOLUTION INTRAVENOUS EVERY 4 HOURS PRN
Status: DISCONTINUED | OUTPATIENT
Start: 2024-08-02 | End: 2024-08-03 | Stop reason: HOSPADM

## 2024-08-02 RX ORDER — METOPROLOL TARTRATE 1 MG/ML
5 INJECTION, SOLUTION INTRAVENOUS
Status: COMPLETED | OUTPATIENT
Start: 2024-08-02 | End: 2024-08-02

## 2024-08-02 RX ORDER — SODIUM CHLORIDE 9 MG/ML
INJECTION, SOLUTION INTRAVENOUS CONTINUOUS
Status: ACTIVE | OUTPATIENT
Start: 2024-08-02 | End: 2024-08-02

## 2024-08-02 RX ORDER — HEPARIN SOD,PORCINE/0.9 % NACL 1000/500ML
INTRAVENOUS SOLUTION INTRAVENOUS
Status: DISCONTINUED | OUTPATIENT
Start: 2024-08-02 | End: 2024-08-02 | Stop reason: HOSPADM

## 2024-08-02 RX ORDER — ACETAMINOPHEN 325 MG/1
650 TABLET ORAL EVERY 4 HOURS PRN
Status: DISCONTINUED | OUTPATIENT
Start: 2024-08-02 | End: 2024-08-02

## 2024-08-02 RX ORDER — ACETAMINOPHEN 500 MG
1000 TABLET ORAL EVERY 6 HOURS PRN
Status: DISCONTINUED | OUTPATIENT
Start: 2024-08-02 | End: 2024-08-02

## 2024-08-02 RX ORDER — POTASSIUM CHLORIDE 20 MEQ/1
40 TABLET, EXTENDED RELEASE ORAL 2 TIMES DAILY
Status: DISCONTINUED | OUTPATIENT
Start: 2024-08-02 | End: 2024-08-03 | Stop reason: HOSPADM

## 2024-08-02 RX ADMIN — POTASSIUM CHLORIDE 40 MEQ: 1500 TABLET, EXTENDED RELEASE ORAL at 08:08

## 2024-08-02 RX ADMIN — HEPARIN SODIUM 12 UNITS/KG/HR: 10000 INJECTION, SOLUTION INTRAVENOUS at 06:08

## 2024-08-02 RX ADMIN — TICAGRELOR 180 MG: 90 TABLET ORAL at 06:08

## 2024-08-02 RX ADMIN — SODIUM CHLORIDE: 9 INJECTION, SOLUTION INTRAVENOUS at 02:08

## 2024-08-02 RX ADMIN — METOPROLOL TARTRATE 25 MG: 25 TABLET, FILM COATED ORAL at 09:08

## 2024-08-02 RX ADMIN — ASPIRIN 325 MG ORAL TABLET 325 MG: 325 PILL ORAL at 06:08

## 2024-08-02 RX ADMIN — OXYCODONE 5 MG: 5 TABLET ORAL at 05:08

## 2024-08-02 RX ADMIN — NITROGLYCERIN 1 INCH: 20 OINTMENT TOPICAL at 06:08

## 2024-08-02 RX ADMIN — TICAGRELOR 90 MG: 90 TABLET ORAL at 08:08

## 2024-08-02 RX ADMIN — METOPROLOL TARTRATE 25 MG: 25 TABLET, FILM COATED ORAL at 08:08

## 2024-08-02 RX ADMIN — POTASSIUM CHLORIDE 40 MEQ: 1500 TABLET, EXTENDED RELEASE ORAL at 10:08

## 2024-08-02 RX ADMIN — METOPROLOL TARTRATE 5 MG: 1 INJECTION, SOLUTION INTRAVENOUS at 06:08

## 2024-08-03 VITALS
RESPIRATION RATE: 18 BRPM | HEART RATE: 85 BPM | WEIGHT: 199.06 LBS | TEMPERATURE: 98 F | HEIGHT: 63 IN | SYSTOLIC BLOOD PRESSURE: 141 MMHG | BODY MASS INDEX: 35.27 KG/M2 | OXYGEN SATURATION: 98 % | DIASTOLIC BLOOD PRESSURE: 69 MMHG

## 2024-08-03 PROBLEM — I21.4 NSTEMI (NON-ST ELEVATED MYOCARDIAL INFARCTION): Status: RESOLVED | Noted: 2024-08-02 | Resolved: 2024-08-03

## 2024-08-03 PROBLEM — E87.6 HYPOKALEMIA: Status: RESOLVED | Noted: 2024-08-02 | Resolved: 2024-08-03

## 2024-08-03 LAB
ANION GAP SERPL CALCULATED.3IONS-SCNC: 10 MMOL/L (ref 7–16)
BASOPHILS # BLD AUTO: 0.06 K/UL (ref 0–0.2)
BASOPHILS NFR BLD AUTO: 0.9 % (ref 0–1)
BUN SERPL-MCNC: 8 MG/DL (ref 7–18)
BUN/CREAT SERPL: 9 (ref 6–20)
CALCIUM SERPL-MCNC: 9 MG/DL (ref 8.5–10.1)
CHLORIDE SERPL-SCNC: 112 MMOL/L (ref 98–107)
CO2 SERPL-SCNC: 23 MMOL/L (ref 21–32)
CREAT SERPL-MCNC: 0.92 MG/DL (ref 0.55–1.02)
DIFFERENTIAL METHOD BLD: ABNORMAL
EGFR (NO RACE VARIABLE) (RUSH/TITUS): 80 ML/MIN/1.73M2
EOSINOPHIL # BLD AUTO: 0.14 K/UL (ref 0–0.5)
EOSINOPHIL NFR BLD AUTO: 2.2 % (ref 1–4)
ERYTHROCYTE [DISTWIDTH] IN BLOOD BY AUTOMATED COUNT: 14.4 % (ref 11.5–14.5)
GLUCOSE SERPL-MCNC: 100 MG/DL (ref 70–105)
GLUCOSE SERPL-MCNC: 114 MG/DL (ref 70–105)
GLUCOSE SERPL-MCNC: 94 MG/DL (ref 74–106)
HCT VFR BLD AUTO: 36 % (ref 38–47)
HGB BLD-MCNC: 12.4 G/DL (ref 12–16)
IMM GRANULOCYTES # BLD AUTO: 0.02 K/UL (ref 0–0.04)
IMM GRANULOCYTES NFR BLD: 0.3 % (ref 0–0.4)
LYMPHOCYTES # BLD AUTO: 1.73 K/UL (ref 1–4.8)
LYMPHOCYTES NFR BLD AUTO: 27.1 % (ref 27–41)
MCH RBC QN AUTO: 31 PG (ref 27–31)
MCHC RBC AUTO-ENTMCNC: 34.4 G/DL (ref 32–36)
MCV RBC AUTO: 90 FL (ref 80–96)
MONOCYTES # BLD AUTO: 0.43 K/UL (ref 0–0.8)
MONOCYTES NFR BLD AUTO: 6.7 % (ref 2–6)
MPC BLD CALC-MCNC: 12.7 FL (ref 9.4–12.4)
NEUTROPHILS # BLD AUTO: 4 K/UL (ref 1.8–7.7)
NEUTROPHILS NFR BLD AUTO: 62.8 % (ref 53–65)
NRBC # BLD AUTO: 0 X10E3/UL
NRBC, AUTO (.00): 0 %
PLATELET # BLD AUTO: 198 K/UL (ref 150–400)
POTASSIUM SERPL-SCNC: 3.5 MMOL/L (ref 3.5–5.1)
RBC # BLD AUTO: 4 M/UL (ref 4.2–5.4)
SODIUM SERPL-SCNC: 141 MMOL/L (ref 136–145)
WBC # BLD AUTO: 6.38 K/UL (ref 4.5–11)

## 2024-08-03 PROCEDURE — 85025 COMPLETE CBC W/AUTO DIFF WBC: CPT | Performed by: FAMILY MEDICINE

## 2024-08-03 PROCEDURE — 99214 OFFICE O/P EST MOD 30 MIN: CPT | Mod: ,,, | Performed by: INTERNAL MEDICINE

## 2024-08-03 PROCEDURE — 82962 GLUCOSE BLOOD TEST: CPT

## 2024-08-03 PROCEDURE — 99239 HOSP IP/OBS DSCHRG MGMT >30: CPT | Mod: ,,, | Performed by: FAMILY MEDICINE

## 2024-08-03 PROCEDURE — G0378 HOSPITAL OBSERVATION PER HR: HCPCS

## 2024-08-03 PROCEDURE — 25000003 PHARM REV CODE 250: Performed by: NURSE PRACTITIONER

## 2024-08-03 PROCEDURE — 25000003 PHARM REV CODE 250: Performed by: FAMILY MEDICINE

## 2024-08-03 PROCEDURE — 80048 BASIC METABOLIC PNL TOTAL CA: CPT | Performed by: FAMILY MEDICINE

## 2024-08-03 PROCEDURE — 36415 COLL VENOUS BLD VENIPUNCTURE: CPT | Performed by: FAMILY MEDICINE

## 2024-08-03 PROCEDURE — 94761 N-INVAS EAR/PLS OXIMETRY MLT: CPT

## 2024-08-03 RX ORDER — METOPROLOL SUCCINATE 25 MG/1
25 TABLET, EXTENDED RELEASE ORAL DAILY
Qty: 30 TABLET | Refills: 3 | Status: SHIPPED | OUTPATIENT
Start: 2024-08-03 | End: 2025-08-03

## 2024-08-03 RX ORDER — NAPROXEN SODIUM 220 MG/1
81 TABLET, FILM COATED ORAL DAILY
Start: 2024-08-03 | End: 2025-08-03

## 2024-08-03 RX ADMIN — TICAGRELOR 90 MG: 90 TABLET ORAL at 09:08

## 2024-08-03 RX ADMIN — AMLODIPINE BESYLATE 10 MG: 10 TABLET ORAL at 09:08

## 2024-08-03 RX ADMIN — POTASSIUM CHLORIDE 40 MEQ: 1500 TABLET, EXTENDED RELEASE ORAL at 09:08

## 2024-08-03 RX ADMIN — ASPIRIN 81 MG CHEWABLE TABLET 81 MG: 81 TABLET CHEWABLE at 09:08

## 2024-08-03 RX ADMIN — METOPROLOL TARTRATE 25 MG: 25 TABLET, FILM COATED ORAL at 09:08

## 2024-08-06 LAB
OHS QRS DURATION: 80 MS
OHS QTC CALCULATION: 421 MS

## 2024-08-07 LAB
POC ACTIVATED CLOTTING TIME K: 206 SEC
POC ACTIVATED CLOTTING TIME K: 340 SEC

## 2024-08-15 ENCOUNTER — OFFICE VISIT (OUTPATIENT)
Dept: CARDIOLOGY | Facility: CLINIC | Age: 44
End: 2024-08-15
Payer: COMMERCIAL

## 2024-08-15 VITALS
BODY MASS INDEX: 35.08 KG/M2 | HEART RATE: 94 BPM | OXYGEN SATURATION: 98 % | HEIGHT: 63 IN | DIASTOLIC BLOOD PRESSURE: 70 MMHG | SYSTOLIC BLOOD PRESSURE: 110 MMHG | WEIGHT: 198 LBS

## 2024-08-15 DIAGNOSIS — R00.2 PALPITATIONS: ICD-10-CM

## 2024-08-15 DIAGNOSIS — I10 PRIMARY HYPERTENSION: ICD-10-CM

## 2024-08-15 DIAGNOSIS — R06.02 SOB (SHORTNESS OF BREATH): ICD-10-CM

## 2024-08-15 DIAGNOSIS — I25.10 CORONARY ARTERY DISEASE INVOLVING NATIVE CORONARY ARTERY OF NATIVE HEART WITHOUT ANGINA PECTORIS: Primary | ICD-10-CM

## 2024-08-15 PROCEDURE — 3078F DIAST BP <80 MM HG: CPT | Mod: S$GLB,,, | Performed by: REGISTERED NURSE

## 2024-08-15 PROCEDURE — 99203 OFFICE O/P NEW LOW 30 MIN: CPT | Mod: S$GLB,,, | Performed by: REGISTERED NURSE

## 2024-08-15 PROCEDURE — 3074F SYST BP LT 130 MM HG: CPT | Mod: S$GLB,,, | Performed by: REGISTERED NURSE

## 2024-08-15 PROCEDURE — 99999 PR PBB SHADOW E&M-EST. PATIENT-LVL III: CPT | Mod: PBBFAC,,, | Performed by: REGISTERED NURSE

## 2024-08-15 PROCEDURE — 3008F BODY MASS INDEX DOCD: CPT | Mod: S$GLB,,, | Performed by: REGISTERED NURSE

## 2024-08-15 PROCEDURE — 1159F MED LIST DOCD IN RCRD: CPT | Mod: S$GLB,,, | Performed by: REGISTERED NURSE

## 2024-08-15 RX ORDER — ROSUVASTATIN CALCIUM 20 MG/1
20 TABLET, COATED ORAL DAILY
Qty: 90 TABLET | Refills: 3 | Status: SHIPPED | OUTPATIENT
Start: 2024-08-15 | End: 2025-08-15

## 2024-08-15 NOTE — PROGRESS NOTES
PCP: No, Primary Doctor    Referring Provider:     Subjective:   Penny Lozano is a 44 y.o. female with hx of HIV, HTN, HLD  who presents for f/u>    8/15/24: s/p [facility] with pci pLAD. No complaints today. Denies chest pain or dyspnea.        Fhx:No family history on file.  Shx:   Social History     Socioeconomic History    Marital status: Single   Tobacco Use    Smoking status: Never    Smokeless tobacco: Never   Substance and Sexual Activity    Alcohol use: Never    Drug use: Never    Sexual activity: Yes     Birth control/protection: Injection     Social Determinants of Health     Financial Resource Strain: Low Risk  (8/15/2024)    Overall Financial Resource Strain (CARDIA)     Difficulty of Paying Living Expenses: Not hard at all   Food Insecurity: No Food Insecurity (8/15/2024)    Hunger Vital Sign     Worried About Running Out of Food in the Last Year: Never true     Ran Out of Food in the Last Year: Never true   Transportation Needs: No Transportation Needs (8/3/2024)    TRANSPORTATION NEEDS     Transportation : No   Physical Activity: Insufficiently Active (8/15/2024)    Exercise Vital Sign     Days of Exercise per Week: 2 days     Minutes of Exercise per Session: 30 min   Stress: Patient Declined (8/15/2024)    [employer] - Occupational Stress Questionnaire     Feeling of Stress : Patient declined   Housing Stability: Low Risk  (8/15/2024)    Housing Stability Vital Sign     Unable to Pay for Housing in the Last Year: No     Homeless in the Last Year: No       EKG   8/15/24: Sinus tachycardia w/inferior T wave abnormality    LHC:8/2/24    The Prox LAD lesion was 90% stenosed with 0% stenosis post-intervention.    The pre-procedure left ventricular end diastolic pressure was 16.    Prox LAD lesion: A 3.0x20mm sc bal was used to pre-dilate 90-95% pLAD lesion    Prox LAD lesion: A 3.5x20mm JUAN FRANCISCO x1 was placed across the lesion, successfully deployed at 11 BAILEE for 6 sec, 0% residual,  SYLVIA 2->3 flow, and excellent angiographic result    The estimated blood loss was <50 mL.    There was single vessel coronary artery disease.    This was a successful PCI for acute myocardial infarction.       ECHO Results for orders placed during the hospital encounter of 08/02/24    Echo    Interpretation Summary    Left Ventricle: The left ventricle is normal in size. Mildly increased wall thickness. There is concentric hypertrophy. There is normal systolic function with a visually estimated ejection fraction of 55 - 60%. There is normal diastolic function.    Right Ventricle: Normal right ventricular cavity size. Systolic function is normal.    Aortic Valve: The aortic valve is a trileaflet valve.    Tricuspid Valve: There is mild regurgitation with an eccentrically directed jet.    IVC/SVC: Normal venous pressure at 3 mmHg.    Pericardium: There is a trivial effusion. No indication of cardiac tamponade.    [facility] Results for orders placed during the hospital encounter of 08/02/24    Cardiac catheterization    Conclusion    The Prox LAD lesion was 90% stenosed with 0% stenosis post-intervention.    The pre-procedure left ventricular end diastolic pressure was 16.    Prox LAD lesion: A 3.0x20mm sc bal was used to pre-dilate 90-95% pLAD lesion    Prox LAD lesion: A 3.5x20mm JUAN FRANCISCO x1 was placed across the lesion, successfully deployed at 11 BAILEE for 6 sec, 0% residual, SYLVIA 2->3 flow, and excellent angiographic result    The estimated blood loss was <50 mL.    There was single vessel coronary artery disease.    This was a successful PCI for acute myocardial infarction.    The procedure log was documented by Documenter: Alma Black RN and verified by Nish Nelson MD.    Date: 8/2/2024  Time: 2:35 PM        Lab Results   Component Value Date     08/03/2024    K 3.5 08/03/2024     (H) 08/03/2024    CO2 23 08/03/2024    BUN 8 08/03/2024    CREATININE 0.92 08/03/2024    CALCIUM 9.0 08/03/2024     "ANIONGAP 10 08/03/2024       No results found for: "CHOL"  No results found for: "HDL"  No results found for: "LDLCALC"  No results found for: "TRIG"  No results found for: "CHOLHDL"    Lab Results   Component Value Date    WBC 6.38 08/03/2024    HGB 12.4 08/03/2024    HCT 36.0 (L) 08/03/2024    MCV 90.0 08/03/2024     08/03/2024           Current Outpatient Medications:     amLODIPine (NORVASC) 10 MG tablet, Take 10 mg by mouth., Disp: , Rfl:     aspirin 81 MG Chew, Take 1 tablet (81 mg total) by mouth once daily., Disp: , Rfl:     cetirizine (ZYRTEC) 10 MG tablet, Take 10 mg by mouth once daily., Disp: , Rfl:     metoprolol succinate (TOPROL-XL) 25 MG 24 hr tablet, Take 1 tablet (25 mg total) by mouth once daily., Disp: 30 tablet, Rfl: 3    ticagrelor (BRILINTA) 90 mg tablet, Take 1 tablet (90 mg total) by mouth 2 (two) times a day., Disp: 60 tablet, Rfl: 0    darunavir-nick-emtri-tenof ala [phone] mg Tab, Take 1 tablet by mouth once daily., Disp: , Rfl:     rosuvastatin (CRESTOR) 20 MG tablet, Take 1 tablet (20 mg total) by mouth once daily., Disp: 90 tablet, Rfl: 3    Review of Systems   Respiratory:  Negative for shortness of breath.    Cardiovascular:  Negative for chest pain, palpitations, orthopnea and claudication.   Gastrointestinal:  Negative for heartburn.   All other systems reviewed and are negative.          Objective:   /70 (BP Location: Left arm, Patient Position: Sitting)   Pulse 94   Ht 5' 3" (1.6 m)   Wt 89.8 kg (198 lb)   SpO2 98%   BMI 35.07 kg/m²     Physical Exam  Vitals reviewed.   Constitutional:       General: She is not in acute distress.     Appearance: She is not ill-appearing.   HENT:      Head: Normocephalic.      Mouth/Throat:      Mouth: Mucous membranes are moist.   Eyes:      Extraocular Movements: Extraocular movements intact.   Cardiovascular:      Rate and Rhythm: Normal rate.   Pulmonary:      Effort: Pulmonary effort is normal.   Abdominal:      " General: Bowel sounds are normal. There is no distension.      Palpations: Abdomen is soft.   Musculoskeletal:         General: No swelling. Normal range of motion.      Cervical back: Normal range of motion.   Skin:     General: Skin is warm and dry.      Capillary Refill: Capillary refill takes less than 2 seconds.   Neurological:      General: No focal deficit present.      Mental Status: She is alert.   Psychiatric:         Mood and Affect: Mood normal.           Assessment:     1. Coronary artery disease involving native coronary artery of native heart without angina pectoris  Lipid Panel      2. SOB (shortness of breath)  EKG 12-lead    EKG 12-lead      3. Palpitations  EKG 12-lead    EKG 12-lead      4. Primary hypertension              Plan:   Coronary artery disease involving native coronary artery without angina pectoris  Pci-> pLAD (2024)  EF 55%  Brilinta, asa, statin  Lipid panel  Cardiac reab consult    Primary hypertension  Well controlled today BB and Norvasc

## 2025-01-16 ENCOUNTER — OFFICE VISIT (OUTPATIENT)
Dept: CARDIOLOGY | Facility: CLINIC | Age: 45
End: 2025-01-16

## 2025-01-16 VITALS
DIASTOLIC BLOOD PRESSURE: 82 MMHG | WEIGHT: 190 LBS | BODY MASS INDEX: 33.66 KG/M2 | HEIGHT: 63 IN | HEART RATE: 101 BPM | OXYGEN SATURATION: 99 % | SYSTOLIC BLOOD PRESSURE: 120 MMHG

## 2025-01-16 DIAGNOSIS — I25.10 CORONARY ARTERY DISEASE INVOLVING NATIVE CORONARY ARTERY OF NATIVE HEART WITHOUT ANGINA PECTORIS: ICD-10-CM

## 2025-01-16 DIAGNOSIS — E66.01 MORBID OBESITY: ICD-10-CM

## 2025-01-16 DIAGNOSIS — I10 PRIMARY HYPERTENSION: Primary | ICD-10-CM

## 2025-01-16 DIAGNOSIS — R29.818 SUSPECTED SLEEP APNEA: ICD-10-CM

## 2025-01-16 DIAGNOSIS — I21.4 NSTEMI (NON-ST ELEVATED MYOCARDIAL INFARCTION): ICD-10-CM

## 2025-01-16 PROCEDURE — 93010 ELECTROCARDIOGRAM REPORT: CPT | Mod: S$PBB,,, | Performed by: INTERNAL MEDICINE

## 2025-01-16 PROCEDURE — 99214 OFFICE O/P EST MOD 30 MIN: CPT | Mod: PBBFAC,25 | Performed by: INTERNAL MEDICINE

## 2025-01-16 PROCEDURE — 99999 PR PBB SHADOW E&M-EST. PATIENT-LVL IV: CPT | Mod: PBBFAC,,, | Performed by: INTERNAL MEDICINE

## 2025-01-16 PROCEDURE — 99214 OFFICE O/P EST MOD 30 MIN: CPT | Mod: S$PBB,,, | Performed by: INTERNAL MEDICINE

## 2025-01-16 PROCEDURE — 93005 ELECTROCARDIOGRAM TRACING: CPT | Mod: PBBFAC | Performed by: INTERNAL MEDICINE

## 2025-01-16 NOTE — PROGRESS NOTES
PCP: Tali, Primary Doctor    Referring Provider:     Subjective:   Penny Lozano is a 44 y.o. female with hx of HIV, HTN, HLD  who presents for f/u CAD.  She reports mid sternal chest pain, 6/10, provoked by exercise, relieved with rest resolved following PCI with JUAN FRANCISCO LAD 8/24.   She has resumed normal activity, including working as a  without symptoms of chest pain, pressure or shortness of breath. \    8/15/24: s/p LHC with pci pLAD. No complaints today. Denies chest pain or dyspnea.        Fhx  Dad alive at 75, hx CAD post PCI.  Mon alive at 75, in good health.   Sis: has irreg heart beat.   Shx:   Past Surgical History:   Procedure Laterality Date    ANGIOGRAM, CORONARY, WITH LEFT HEART CATHETERIZATION N/A 8/2/2024    Procedure: Angiogram, Coronary, with Left Heart Cath;  Surgeon: Nish Nelson MD;  Location: [facility] CATH LAB;  Service: Cardiology;  Laterality: N/A;    PERCUTANEOUS CORONARY INTERVENTION, ARTERY N/A 8/2/2024    Procedure: Percutaneous coronary intervention;  Surgeon: Nish Nelson MD;  Location: [facility] CATH LAB;  Service: Cardiology;  Laterality: N/A;   .   Social History     Socioeconomic History    Marital status: Single   Tobacco Use    Smoking status: Never    Smokeless tobacco: Never   Substance and Sexual Activity    Alcohol use: Never    Drug use: Never    Sexual activity: Yes     Birth control/protection: Injection     Social Drivers of Health     Financial Resource Strain: Low Risk  (10/18/2024)    Received from [facility]    Overall Financial Resource Strain (CARDIA)     Difficulty of Paying Living Expenses: Not hard at all   Food Insecurity: No Food Insecurity (10/18/2024)    Received from [facility] and [facility]    Hunger Vital Sign     Worried About Running Out of Food in the Last Year: Never true     Ran Out of Food in the Last Year: Never true   Transportation Needs: No Transportation Needs  (10/18/2024)    Received from [facility]    PRAPARE - Transportation     Lack of Transportation (Medical): No     Lack of Transportation (Non-Medical): No   Physical Activity: Insufficiently Active (8/15/2024)    Exercise Vital Sign     Days of Exercise per Week: 2 days     Minutes of Exercise per Session: 30 min   Stress: No Stress Concern Present (10/18/2024)    Received from [facility] and [facility]    [employer] - Occupational Stress Questionnaire     Feeling of Stress : Not at all   Housing Stability: Low Risk  (10/18/2024)    Received from [facility] and [facility]    Housing Stability Vital Sign     Unable to Pay for Housing in the Last Year: No     Number of Times Moved in the Last Year: 1     Homeless in the Last Year: No       EKG   8/15/24: Sinus tachycardia w/inferior T wave abnormality    LHC:8/2/24    The Prox LAD lesion was 90% stenosed with 0% stenosis post-intervention.    The pre-procedure left ventricular end diastolic pressure was 16.    Prox LAD lesion: A 3.0x20mm sc bal was used to pre-dilate 90-95% pLAD lesion    Prox LAD lesion: A 3.5x20mm JUAN FRANCISCO x1 was placed across the lesion, successfully deployed at 11 BAILEE for 6 sec, 0% residual, SYLVIA 2->3 flow, and excellent angiographic result    The estimated blood loss was <50 mL.    There was single vessel coronary artery disease.    This was a successful PCI for acute myocardial infarction.       ECHO Results for orders placed during the hospital encounter of 08/02/24    Echo    Interpretation Summary    Left Ventricle: The left ventricle is normal in size. Mildly increased wall thickness. There is concentric hypertrophy. There is normal systolic function with a visually estimated ejection fraction of 55 - 60%. There is normal diastolic function.    Right Ventricle: Normal right ventricular cavity size. Systolic function is normal.    Aortic  Valve: The aortic valve is a trileaflet valve.    Tricuspid Valve: There is mild regurgitation with an eccentrically directed jet.    IVC/SVC: Normal venous pressure at 3 mmHg.    Pericardium: There is a trivial effusion. No indication of cardiac tamponade.    [facility] Results for orders placed during the hospital encounter of 08/02/24    Cardiac catheterization    Conclusion    The Prox LAD lesion was 90% stenosed with 0% stenosis post-intervention.    The pre-procedure left ventricular end diastolic pressure was 16.    Prox LAD lesion: A 3.0x20mm sc bal was used to pre-dilate 90-95% pLAD lesion    Prox LAD lesion: A 3.5x20mm JUAN FRANCISCO x1 was placed across the lesion, successfully deployed at 11 BAILEE for 6 sec, 0% residual, SYLVIA 2->3 flow, and excellent angiographic result    The estimated blood loss was <50 mL.    There was single vessel coronary artery disease.    This was a successful PCI for acute myocardial infarction.    The procedure log was documented by Documenter: Alma Black RN and verified by Nish Nelson MD.    Date: 8/2/2024  Time: 2:35 PM        Lab Results   Component Value Date     08/03/2024    K 3.5 08/03/2024     (H) 08/03/2024    CO2 23 08/03/2024    BUN 8 08/03/2024    CREATININE 0.92 08/03/2024    CALCIUM 9.0 08/03/2024    ANIONGAP 10 08/03/2024       Lab Results   Component Value Date    CHOL 180 08/16/2024     Lab Results   Component Value Date    HDL 53 08/16/2024     Lab Results   Component Value Date    LDLCALC 112 08/16/2024     Lab Results   Component Value Date    TRIG 77 08/16/2024     Lab Results   Component Value Date    CHOLHDL 3.4 08/16/2024       Lab Results   Component Value Date    WBC 6.38 08/03/2024    HGB 12.4 08/03/2024    HCT 36.0 (L) 08/03/2024    MCV 90.0 08/03/2024     08/03/2024           Current Outpatient Medications:     amLODIPine (NORVASC) 10 MG tablet, Take 10 mg by mouth., Disp: , Rfl:     aspirin 81 MG Chew, Take 1 tablet (81 mg total) by  "mouth once daily., Disp: , Rfl:     cetirizine (ZYRTEC) 10 MG tablet, Take 10 mg by mouth once daily., Disp: , Rfl:     darunavir-nick-emtri-tenof ala [phone] mg Tab, Take 1 tablet by mouth once daily., Disp: , Rfl:     metoprolol succinate (TOPROL-XL) 25 MG 24 hr tablet, Take 1 tablet (25 mg total) by mouth once daily., Disp: 30 tablet, Rfl: 3    rosuvastatin (CRESTOR) 20 MG tablet, Take 1 tablet (20 mg total) by mouth once daily., Disp: 90 tablet, Rfl: 3    ticagrelor (BRILINTA) 90 mg tablet, Take 1 tablet (90 mg total) by mouth 2 (two) times a day., Disp: 180 tablet, Rfl: 3    Review of Systems   Respiratory:  Negative for shortness of breath.    Cardiovascular:  Negative for chest pain, palpitations, orthopnea and claudication.   Gastrointestinal:  Negative for heartburn.   All other systems reviewed and are negative.          Objective:   /82 (BP Location: Left arm, Patient Position: Sitting)   Pulse 101   Ht 5' 3" (1.6 m)   Wt 86.2 kg (190 lb)   SpO2 99%   BMI 33.66 kg/m²     Physical Exam  Vitals reviewed.   Constitutional:       General: She is not in acute distress.     Appearance: She is not ill-appearing.   HENT:      Head: Normocephalic.      Mouth/Throat:      Mouth: Mucous membranes are moist.   Eyes:      Extraocular Movements: Extraocular movements intact.   Cardiovascular:      Rate and Rhythm: Normal rate.   Pulmonary:      Effort: Pulmonary effort is normal.   Abdominal:      General: Bowel sounds are normal. There is no distension.      Palpations: Abdomen is soft.   Musculoskeletal:         General: No swelling. Normal range of motion.      Cervical back: Normal range of motion.   Skin:     General: Skin is warm and dry.      Capillary Refill: Capillary refill takes less than 2 seconds.   Neurological:      General: No focal deficit present.      Mental Status: She is alert.   Psychiatric:         Mood and Affect: Mood normal.           Assessment:     1. Primary hypertension  " EKG 12-lead    EKG 12-lead    Bp adequately controlled on current meds      2. NSTEMI (non-ST elevated myocardial infarction)      status post emergent PCI with JUAN FRANCISCO LAD for STEMI 8/24      3. Suspected sleep apnea      declines further eval.      4. Coronary artery disease involving native coronary artery of native heart without angina pectoris      Stable class 1 angina, single vessel disease.      5. Morbid obesity      discussed lifestlye changes, with 10 lb wt loss goal for next 6 months.            Plan:   Follow up with Fabio Salgado NP in six months, plan on DC Brilinta 8/25, see me in one year.

## 2025-01-17 LAB
OHS QRS DURATION: 90 MS
OHS QTC CALCULATION: 445 MS

## 2025-01-27 RX ORDER — METOPROLOL SUCCINATE 25 MG/1
25 TABLET, EXTENDED RELEASE ORAL DAILY
Qty: 90 TABLET | Refills: 1 | Status: SHIPPED | OUTPATIENT
Start: 2025-01-27 | End: 2026-01-27

## 2025-01-30 ENCOUNTER — TELEPHONE (OUTPATIENT)
Dept: CARDIOLOGY | Facility: CLINIC | Age: 45
End: 2025-01-30

## 2025-01-30 NOTE — TELEPHONE ENCOUNTER
Patient's prior authorization for Brilinta has been approved, valid through  January 29th 2025 to January 29, 2026.

## 2025-07-18 ENCOUNTER — TELEPHONE (OUTPATIENT)
Dept: CARDIOLOGY | Facility: CLINIC | Age: 45
End: 2025-07-18

## 2025-07-18 NOTE — TELEPHONE ENCOUNTER
Called patient on today to remind her of her appt on July 21st with  at 10:15, but it went to her VM.    Left the patient a VM with appt date and time. Patient must arrive 20 min before appt time if possible for her EKG.

## 2025-08-07 ENCOUNTER — HOSPITAL ENCOUNTER (EMERGENCY)
Facility: HOSPITAL | Age: 45
Discharge: HOME | End: 2025-08-07
Payer: COMMERCIAL

## 2025-08-07 VITALS
TEMPERATURE: 98 F | BODY MASS INDEX: 35.08 KG/M2 | SYSTOLIC BLOOD PRESSURE: 130 MMHG | HEART RATE: 100 BPM | DIASTOLIC BLOOD PRESSURE: 81 MMHG | RESPIRATION RATE: 12 BRPM | OXYGEN SATURATION: 98 % | WEIGHT: 198 LBS | HEIGHT: 63 IN

## 2025-08-07 DIAGNOSIS — R07.9 CHEST PAIN: ICD-10-CM

## 2025-08-07 DIAGNOSIS — S29.9XXA CHEST TRAUMA: ICD-10-CM

## 2025-08-07 LAB
ANION GAP SERPL CALCULATED.3IONS-SCNC: 11 MMOL/L (ref 7–16)
BASOPHILS # BLD AUTO: 0.06 K/UL (ref 0–0.2)
BASOPHILS NFR BLD AUTO: 0.7 % (ref 0–1)
BUN SERPL-MCNC: 13 MG/DL (ref 7–19)
BUN/CREAT SERPL: 11 (ref 6–20)
CALCIUM SERPL-MCNC: 9 MG/DL (ref 8.4–10.2)
CHLORIDE SERPL-SCNC: 104 MMOL/L (ref 98–107)
CO2 SERPL-SCNC: 23 MMOL/L (ref 22–29)
CREAT SERPL-MCNC: 1.15 MG/DL (ref 0.55–1.02)
DIFFERENTIAL METHOD BLD: ABNORMAL
EGFR (NO RACE VARIABLE) (RUSH/TITUS): 61 ML/MIN/1.73M2
EOSINOPHIL # BLD AUTO: 0.19 K/UL (ref 0–0.5)
EOSINOPHIL NFR BLD AUTO: 2.2 % (ref 1–4)
ERYTHROCYTE [DISTWIDTH] IN BLOOD BY AUTOMATED COUNT: 13.7 % (ref 11.5–14.5)
GLUCOSE SERPL-MCNC: 104 MG/DL (ref 74–100)
HCT VFR BLD AUTO: 38.5 % (ref 38–47)
HGB BLD-MCNC: 13.4 G/DL (ref 12–16)
IMM GRANULOCYTES # BLD AUTO: 0.05 K/UL (ref 0–0.04)
IMM GRANULOCYTES NFR BLD: 0.6 % (ref 0–0.4)
LYMPHOCYTES # BLD AUTO: 1.9 K/UL (ref 1–4.8)
LYMPHOCYTES NFR BLD AUTO: 21.8 % (ref 27–41)
MCH RBC QN AUTO: 30.6 PG (ref 27–31)
MCHC RBC AUTO-ENTMCNC: 34.8 G/DL (ref 32–36)
MCV RBC AUTO: 87.9 FL (ref 80–96)
MONOCYTES # BLD AUTO: 0.43 K/UL (ref 0–0.8)
MONOCYTES NFR BLD AUTO: 4.9 % (ref 2–6)
MPC BLD CALC-MCNC: 11.8 FL (ref 9.4–12.4)
NEUTROPHILS # BLD AUTO: 6.07 K/UL (ref 1.8–7.7)
NEUTROPHILS NFR BLD AUTO: 69.8 % (ref 53–65)
NRBC # BLD AUTO: 0 X10E3/UL
NRBC, AUTO (.00): 0 %
PLATELET # BLD AUTO: 220 K/UL (ref 150–400)
POTASSIUM SERPL-SCNC: 3.5 MMOL/L (ref 3.5–5.1)
RBC # BLD AUTO: 4.38 M/UL (ref 4.2–5.4)
SODIUM SERPL-SCNC: 134 MMOL/L (ref 136–145)
TROPONIN I SERPL HS-MCNC: <2.7 NG/L
WBC # BLD AUTO: 8.7 K/UL (ref 4.5–11)

## 2025-08-07 PROCEDURE — 96375 TX/PRO/DX INJ NEW DRUG ADDON: CPT

## 2025-08-07 PROCEDURE — 36415 COLL VENOUS BLD VENIPUNCTURE: CPT

## 2025-08-07 PROCEDURE — 84484 ASSAY OF TROPONIN QUANT: CPT

## 2025-08-07 PROCEDURE — 63600175 PHARM REV CODE 636 W HCPCS

## 2025-08-07 PROCEDURE — 93005 ELECTROCARDIOGRAM TRACING: CPT

## 2025-08-07 PROCEDURE — 99285 EMERGENCY DEPT VISIT HI MDM: CPT | Mod: 25

## 2025-08-07 PROCEDURE — 96374 THER/PROPH/DIAG INJ IV PUSH: CPT

## 2025-08-07 PROCEDURE — 93010 ELECTROCARDIOGRAM REPORT: CPT | Mod: ,,, | Performed by: INTERNAL MEDICINE

## 2025-08-07 PROCEDURE — 25000003 PHARM REV CODE 250

## 2025-08-07 PROCEDURE — 85025 COMPLETE CBC W/AUTO DIFF WBC: CPT

## 2025-08-07 PROCEDURE — 80048 BASIC METABOLIC PNL TOTAL CA: CPT

## 2025-08-07 PROCEDURE — 96361 HYDRATE IV INFUSION ADD-ON: CPT

## 2025-08-07 RX ORDER — TIZANIDINE 4 MG/1
4 TABLET ORAL EVERY 6 HOURS PRN
Qty: 40 TABLET | Refills: 0 | Status: SHIPPED | OUTPATIENT
Start: 2025-08-07 | End: 2025-08-17

## 2025-08-07 RX ORDER — NAPROXEN SODIUM 550 MG/1
550 TABLET ORAL
Qty: 30 TABLET | Refills: 0 | Status: SHIPPED | OUTPATIENT
Start: 2025-08-07 | End: 2025-08-17

## 2025-08-07 RX ORDER — ORPHENADRINE CITRATE 30 MG/ML
60 INJECTION INTRAMUSCULAR; INTRAVENOUS
Status: COMPLETED | OUTPATIENT
Start: 2025-08-07 | End: 2025-08-07

## 2025-08-07 RX ORDER — KETOROLAC TROMETHAMINE 15 MG/ML
15 INJECTION, SOLUTION INTRAMUSCULAR; INTRAVENOUS
Status: COMPLETED | OUTPATIENT
Start: 2025-08-07 | End: 2025-08-07

## 2025-08-07 RX ADMIN — ORPHENADRINE CITRATE 60 MG: 30 INJECTION, SOLUTION INTRAMUSCULAR; INTRAVENOUS at 09:08

## 2025-08-07 RX ADMIN — KETOROLAC TROMETHAMINE 15 MG: 15 INJECTION, SOLUTION INTRAMUSCULAR; INTRAVENOUS at 09:08

## 2025-08-07 RX ADMIN — SODIUM CHLORIDE 1000 ML: 9 INJECTION, SOLUTION INTRAVENOUS at 10:08

## 2025-08-08 ENCOUNTER — TELEPHONE (OUTPATIENT)
Dept: EMERGENCY MEDICINE | Facility: HOSPITAL | Age: 45
End: 2025-08-08
Payer: COMMERCIAL

## 2025-08-10 LAB
OHS QRS DURATION: 78 MS
OHS QTC CALCULATION: 409 MS

## (undated) DEVICE — CHLORAPREP 10.5 ML APPLICATOR

## (undated) DEVICE — GLOVE SENSICARE PI ALOE 5.5

## (undated) DEVICE — Device

## (undated) DEVICE — GLOVE SENSICARE PI SURG 8

## (undated) DEVICE — NDL PERCUTANEOUS 2.5CM 21G

## (undated) DEVICE — CUFF FLEXIPORT BP LONG ADULT

## (undated) DEVICE — DEVICE BLUE DIAMOND INFL 20ML

## (undated) DEVICE — DECANTER FLUID TRNSF WHITE 9IN

## (undated) DEVICE — ETCO2 NC MICROSTR FEM ST ADLT

## (undated) DEVICE — TOWEL OR DISP STRL BLUE 4/PK

## (undated) DEVICE — SET EXTENSION CLEARLINK 2INJ

## (undated) DEVICE — SET IV PRIMARY

## (undated) DEVICE — KIT CO-PILOT

## (undated) DEVICE — KIT GLIDESHEATH NIT 6FR 10CM

## (undated) DEVICE — GUIDEWIRE VASC 3X190CM .014IN

## (undated) DEVICE — CATH EMERGE MR 20 X 3.00

## (undated) DEVICE — DRESSING TRANS 4X4 TEGADERM

## (undated) DEVICE — GUIDE LAUNCHER 6FR EBU 3.0

## (undated) DEVICE — DRAPE ANGIO BRACH 38X44IN

## (undated) DEVICE — LEADWIRE DL DC EKG 10 PIN

## (undated) DEVICE — PROTECTOR ULNAR NERVE FOAM

## (undated) DEVICE — OXISENSOR ADULT DIGIT N/S

## (undated) DEVICE — TR BAND REGULAR

## (undated) DEVICE — GUIDEWIRE J .035X260CM

## (undated) DEVICE — CONTRAST ISOVUE 370 100ML

## (undated) DEVICE — CATH OPTITORQUE RADIAL 5FR